# Patient Record
(demographics unavailable — no encounter records)

---

## 2024-10-10 NOTE — HISTORY OF PRESENT ILLNESS
[FreeTextEntry1] : Sandrine Cooper is a 69-year-old female presents for a follow up appointment on GERD. Pt  significally clinically improved with use of omeprazole 40mg daily, asymptomatic. Pt has also lost weight, and made changes to diet. As previously planned would begin to titrate off in three months should pt be feeling well. Pt wishes to continue to move forward with plan. Denies bowel complaints, typically has bowel movements regularly without significant straining or overt bleeding such as melena or hematochezia. Denies upper GI symptoms such as GERD, nausea, vomiting, or dysphagia. Denies unintentional weight loss.

## 2024-10-10 NOTE — ASSESSMENT
[FreeTextEntry1] : 69-year-old female presents for follow up on GERD.   Plan:  GERD: Pt did well on management of GERD with omeprazole daily. Pt has made significant changes to diet and lifestyle. Will begin titrating off omeprazole. Want pt to titrate off slowly to prevent rebound GERD from occurring. Pt advised to utilize Pepcid as needed for intermittent symptoms.   If symptoms return fully may need to discuss restarting PPI. Pt verbalized understanding, all questions answered. Discussed with Dr. Knott.

## 2024-10-10 NOTE — PHYSICAL EXAM
[Alert] : alert [Normal Voice/Communication] : normal voice/communication [Healthy Appearing] : healthy appearing [Sclera] : the sclera and conjunctiva were normal [Hearing Threshold Finger Rub Not [place]] : hearing was normal [Normal Lips/Gums] : the lips and gums were normal [Normal Appearance] : the appearance of the neck was normal [No Respiratory Distress] : no respiratory distress [Auscultation Breath Sounds / Voice Sounds] : lungs were clear to auscultation bilaterally [Heart Rate And Rhythm] : heart rate was normal and rhythm regular [Normal S1, S2] : normal S1 and S2 [Bowel Sounds] : normal bowel sounds [Abdomen Tenderness] : non-tender [Abdomen Soft] : soft [Abnormal Walk] : normal gait [Normal Color / Pigmentation] : normal skin color and pigmentation [Oriented To Time, Place, And Person] : oriented to person, place, and time

## 2024-11-06 NOTE — REVIEW OF SYSTEMS
[Weight Loss (___ Lbs)] : [unfilled] ~Ulb weight loss [SOB] : no shortness of breath [Chest Discomfort] : no chest discomfort [Muscle Cramps] : muscle cramps [Negative] : Heme/Lymph

## 2024-11-06 NOTE — HISTORY OF PRESENT ILLNESS
[FreeTextEntry1] : Sandrine Cooper is a 69-year-old woman with a history of remote syncope (2017), hypercholesterolemia, hypothyroidism, GERD, who presents for cardiology consultation.  She has been under the care of Drs. Joel Goldberg and Edilson Sanchez but now wishes to establish care with me.  She considers him to be in good health and has been feeling well.  She is active and reports a healthy diet--working with nutritionist; weight is decreasing.  She describes the recent onset of severe leg cramps that occur predominantly at night and completely resolved after discontinuing rosuvastatin; symptoms recurred after 1 dose of resuming Rx.  She has no history of ischemic heart disease.  She has not been experiencing angina, dyspnea, or palpitations.  *I reviewed a lipid panel from July 2024: Mild elevation (total 193, , triglyceride 166, and HDL 50).

## 2024-11-06 NOTE — PHYSICAL EXAM
[No Acute Distress] : no acute distress [Normal S1, S2] : normal S1, S2 [Clear Lung Fields] : clear lung fields [Normal Gait] : normal gait [No Edema] : no edema [Normal Speech] : normal speech [Alert and Oriented] : alert and oriented [de-identified] : Overweight [de-identified] : Pupils round [de-identified] : Normocephalic [de-identified] : No JVD [de-identified] : Warm, dry

## 2024-11-06 NOTE — DISCUSSION/SUMMARY
[FreeTextEntry1] :  Hyperlipidemia: Recent LDL not at goal and she seems to describe myalgia related to rosuvastatin.  I suggested that we stop rosuvastatin and substitute atorvastatin 20 mg daily; recheck lipid and metabolic panel in mid-December; in the interim, continued efforts to optimize lifestyle to lower cholesterol as well.  Aortic valve insufficiency: Today's echocardiogram reveals aortic valve sclerosis with mild regurgitation--she describes similar findings on past imaging; stable; observation planned.
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
## 2024-12-26 NOTE — REASON FOR VISIT
[Follow - Up] : a follow-up visit [Hypothyroidism] : hypothyroidism [Thyroid nodule/ MNG] : thyroid nodule/ MNG [Home] : at home, [unfilled] , at the time of the visit. [Other Location: e.g. Home (Enter Location, City,State)___] : at [unfilled] [Patient] : the patient
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
## 2024-12-26 NOTE — ASSESSMENT
[Weight Loss] : weight loss [Levothyroxine] : The patient was instructed to take Levothyroxine on an empty stomach, separate from vitamins, and wait at least 30 minutes before eating [FreeTextEntry1] : 69 year old female with PMH of mild asthma, HLD, insomnia is presenting for thyroid nodule, osteopenia and hypothyroidism.  1. Thyroid nodules: Explained that the overwhelming majority of thyroid nodules (over 90%) are benign, but FNA biopsy is recommended if nodule is over 1cm or showing suspicious features (microcalcifications, irregular borders, tall > wide, hypervascularity). Repeat thyroid sonogram now.   2. Hypothyroidism TSH wnl Jan 2024 LT4 88mcg QD  3. Osteoporosis.  She has no history of fragility fracture.  We discussed the potential benefits and risks of the osteoanabolic and antiresorptive classes of pharmacologic osteoporosis therapy. We also discussed the potential benefits and risks of antiresorptive osteoporosis therapy with denosumab or zoledronic acid at length, including but not limited to osteonecrosis of the jaw and atypical femoral fracture. We discussed that denosumab must be dosed every 6 months due to rebound increase in bone breakdown with abrupt discontinuation of therapy, with transition to bisphosphonate therapy prior to a "drug holiday."    I still recommend therapy with IV reclast pending discussion with dental clearance and labs. Will mail consent to patient.  She will consider her options pending evaluation below.  Metabolic evaluation for secondary causes of osteoporosis.  Calcium 1000 mg daily from diet and supplements (to be taken in divided doses as no more than 500-600 mg can be absorbed at one time); advised increased dietary and/or supplemental calcium Can continue vitamin D supplementation 1,000 intl units daily Diet, weight bearing exercises and fall prevention discussed  Patient verbalized understanding of the above. All questions were answered to patient's satisfaction. Dispo: Patient will follow up in 1 year.

## 2024-12-26 NOTE — DATA REVIEWED
[FreeTextEntry1] : US THYROID ONLY  - ORDERED BY: MERVAT BAUTISTA\par  PROCEDURE DATE:  09/06/2022\par  COMPARISON: None available.\par  \par  FINDINGS:\par  Right Lobe: 3.2 cm x  1.3 cm x 1.4 cm. The gland is diffusely heterogeneous with a small midpole slightly hypoechoic 0.4 cm nodule\par  \par  Left Lobe: 3.1 cm x 1.2 cm x 0.9 cm. Diffusely heterogeneous gland without focal nodule\par  \par  Isthmus: 2 mm.\par  \par  Cervical Lymph Nodes: No enlarged or abnormal morphology cervical nodes.\par  \par  IMPRESSION:\par  Diffusely heterogeneous slightly atrophic thyroid gland suggesting underlying thyroiditis.\par  0.4 cm slightly hypoechoic nodule in the right thyroid\par  TI-RAD 4: Moderately suspicious (FNA if > 1.5 cm, Follow if > 1 cm)

## 2025-01-13 NOTE — ASSESSMENT
[FreeTextEntry1] : Patient is a 68yo female who presents to the office complaining of 2 weeks of nasal congestion, PND, mild cough 2/2 PND.  URI, PND - Discussed with pt symptoms likely viral, no signs/symptoms of acute bacterial infection at this time. - Supportive care discussed. - Encouraged to add antihistamine, Flonase. - Alert office if symptoms persist/worsen over the next week to consider abx. - Call the office or go to the ED immediately if you develop new, worsening or concerning symptoms including high fever, severe headache/worst headache of your life, confusion, dizziness/lightheadedness, loss of consciousness, severe chest pain, difficulty breathing, shortness of breath, severe abdominal pain, excessive vomiting/diarrhea, inability to feel/move the extremities, or any other concerning symptoms.

## 2025-01-13 NOTE — PHYSICAL EXAM
[Normal Outer Ear/Nose] : the outer ears and nose were normal in appearance [No Edema] : there was no peripheral edema [Normal] : no rash [Coordination Grossly Intact] : coordination grossly intact [No Focal Deficits] : no focal deficits [Normal Gait] : normal gait [Normal Affect] : the affect was normal [Normal Insight/Judgement] : insight and judgment were intact [de-identified] : small fluid behind bilateral TMs; nasal mucosa mildly edematous/erythematous with clear drainage; PND, mild erythema, no exudates.

## 2025-01-13 NOTE — HISTORY OF PRESENT ILLNESS
[FreeTextEntry8] : Pt reports URI symptoms, nasal congestion x2 weeks. States NC/drainage and PND has persisted and is not resolving. Has occasional cough 2/2 PND, dry and non-productive. Felt a little wheeze last night while laying down before bed. Pt has hx sinus infections, states sinuses do not feel full or painful with current infection. Denies fevers. Not getting in the way of ADLs. Pt has been taking Dayquil, Nyquil for symptoms.

## 2025-01-13 NOTE — REVIEW OF SYSTEMS
[Earache] : no earache [Nasal Discharge] : nasal discharge [Sore Throat] : no sore throat [Postnasal Drip] : postnasal drip [Shortness Of Breath] : no shortness of breath [Wheezing] : no wheezing [Cough] : cough [Negative] : Heme/Lymph

## 2025-01-16 NOTE — ASSESSMENT
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 Discussed importance of high fiber diet and oral hydration. Pt agreeable, will call with any further questions or concerns.

## 2025-01-16 NOTE — PHYSICAL EXAM
[Alert] : alert [Healthy Appearing] : healthy appearing [Sclera] : the sclera and conjunctiva were normal [Hearing Threshold Finger Rub Not [place]] : hearing was normal [Normal Appearance] : the appearance of the neck was normal [No Respiratory Distress] : no respiratory distress [Heart Rate And Rhythm] : heart rate was normal and rhythm regular [Auscultation Breath Sounds / Voice Sounds] : lungs were clear to auscultation bilaterally [Bowel Sounds] : normal bowel sounds [Abdomen Tenderness] : non-tender [Abdomen Soft] : soft [Abnormal Walk] : normal gait [Normal Color / Pigmentation] : normal skin color and pigmentation [Oriented To Time, Place, And Person] : oriented to person, place, and time

## 2025-01-16 NOTE — HISTORY OF PRESENT ILLNESS
[FreeTextEntry1] : Sandrine Cooper is a 68 yo F presenting today for follow up for bowel disturbances. Pt notes in early December had possible viral illness causing diarrhea then had subsequent constipation. called the office several times for guidance with miralax, with good relief. Pt does not want to continue taking regularly. At this point, has been having daily bowel movements sometimes more substantial than others but still feels like she is completely emptying. Denies bleeding such as melena or hematochezia.

## 2025-02-13 NOTE — HISTORY OF PRESENT ILLNESS
[No Pertinent Cardiac History] : no history of aortic stenosis, atrial fibrillation, coronary artery disease, recent myocardial infarction, or implantable device/pacemaker [No Pertinent Pulmonary History] : no history of asthma, COPD, sleep apnea, or smoking [No Adverse Anesthesia Reaction] : no adverse anesthesia reaction in self or family member [(Patient denies any chest pain, claudication, dyspnea on exertion, orthopnea, palpitations or syncope)] : Patient denies any chest pain, claudication, dyspnea on exertion, orthopnea, palpitations or syncope [Chronic Anticoagulation] : no chronic anticoagulation [Chronic Kidney Disease] : no chronic kidney disease [Diabetes] : no diabetes [FreeTextEntry1] : Cataract surgery [FreeTextEntry2] : 3/4/25, 3/11/25 [FreeTextEntry3] : Dr. Palma  [FreeTextEntry4] : Patient is a 70yo female presenting for a preop clearance for cataract surgeries, right on 3/4, left on 3/11 with Dr. Palma. PMH includes HLD, Hypothyroidism, GERD - doesn't need labs  - needs an EKG within 12mo  - had one with cards, Dr. Alfred on 11/06/24

## 2025-04-03 NOTE — REASON FOR VISIT
[Follow - Up] : a follow-up visit [Hypothyroidism] : hypothyroidism [Thyroid nodule/ MNG] : thyroid nodule/ MNG [Other Location: e.g. Home (Enter Location, City,State)___] : at [unfilled] [Patient] : the patient [Home] : at home, [unfilled] , at the time of the visit. [Medical Office: ([facility] [place])___] : at the medical office located in  [Telehealth (audio & video)] : This visit was provided via telehealth using real-time 2-way audio visual technology. [Verbal consent obtained from patient] : the patient, [unfilled]
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
## 2025-04-03 NOTE — ASSESSMENT
[Weight Loss] : weight loss [Levothyroxine] : The patient was instructed to take Levothyroxine on an empty stomach, separate from vitamins, and wait at least 30 minutes before eating [FreeTextEntry1] : 69 year old female with PMH of mild asthma, HLD, insomnia is presenting for thyroid nodule, osteopenia and hypothyroidism.  1. Thyroid nodules: Explained that the overwhelming majority of thyroid nodules (over 90%) are benign, but FNA biopsy is recommended if nodule is over 1cm or showing suspicious features (microcalcifications, irregular borders, tall > wide, hypervascularity). Repeat thyroid sonogram now.   2. Hypothyroidism TSH wnl July 2024 LT4 88mcg QD  3. Osteoporosis.  She has no history of fragility fracture.  We discussed the potential benefits and risks of the osteoanabolic and antiresorptive classes of pharmacologic osteoporosis therapy. We also discussed the potential benefits and risks of antiresorptive osteoporosis therapy with denosumab or zoledronic acid at length, including but not limited to osteonecrosis of the jaw and atypical femoral fracture. We discussed that denosumab must be dosed every 6 months due to rebound increase in bone breakdown with abrupt discontinuation of therapy, with transition to bisphosphonate therapy prior to a "drug holiday."    I still recommend therapy with IV reclast pending discussion with GI clearance for oral bisphosphonates. Pt concerns for reclast side effects.   She will consider her options pending above and labs.   Calcium 1000 mg daily from diet and supplements (to be taken in divided doses as no more than 500-600 mg can be absorbed at one time); advised increased dietary and/or supplemental calcium Can continue vitamin D supplementation 1,000 intl units daily Diet, weight bearing exercises and fall prevention discussed  Patient verbalized understanding of the above. All questions were answered to patient's satisfaction. Dispo: Patient will follow up in 1 year. She will update me regarding above in may 2025.

## 2025-04-05 NOTE — PHYSICAL EXAM

## 2025-04-05 NOTE — HISTORY OF PRESENT ILLNESS
[FreeTextEntry1] : 68yo female with hx GERD, esophageal stricture  She is now with osteoporosis and endocrine wants to start foaxamax However, given her history of UGI symptoms and inflammation with stricture wanted clarification if increased risk from starting fosamax

## 2025-04-05 NOTE — ASSESSMENT
[FreeTextEntry1] : 68yo female with gerd, osteoporosis  ok to take fosamax without concern  no increased risk of complications fro, her disease  famotidine, PPI on demand

## 2025-04-05 NOTE — HISTORY OF PRESENT ILLNESS
[FreeTextEntry1] : 70yo female with hx GERD, esophageal stricture  She is now with osteoporosis and endocrine wants to start foaxamax However, given her history of UGI symptoms and inflammation with stricture wanted clarification if increased risk from starting fosamax

## 2025-04-27 NOTE — PROCEDURE
[Large Joint Injection] : Large joint injection [Left] : of the left [Knee] : knee [Pain] : pain [Inflammation] : inflammation [X-ray evidence of Osteoarthritis on this or prior visit] : x-ray evidence of Osteoarthritis on this or prior visit [Alcohol] : alcohol [Betadine] : betadine [Ethyl Chloride sprayed topically] : ethyl chloride sprayed topically [Sterile technique used] : sterile technique used [___ cc    1%] : Lidocaine ~Vcc of 1%  [___ cc    0.25%] : Bupivacaine (Marcaine) ~Vcc of 0.25%  [___ cc    80mg] : Methylprednisolone (Depomedrol) ~Vcc of 80 mg  [] : Patient tolerated procedure well [Call if redness, pain or fever occur] : call if redness, pain or fever occur [Previous OTC use and PT nontherapeutic] : patient has tried OTC's including aspirin, Ibuprofen, Aleve, etc or prescription NSAIDS, and/or exercises at home and/or physical therapy without satisfactory response [Patient had decreased mobility in the joint] : patient had decreased mobility in the joint [Risks, benefits, alternatives discussed / Verbal consent obtained] : the risks benefits, and alternatives have been discussed, and verbal consent was obtained

## 2025-04-27 NOTE — ASSESSMENT
[FreeTextEntry1] : The patient is here to consider a second CSI. She tolerated the last one well. She denies new trauma but notes increased pain as a result of ADLs and overuse. She is relatively unchanged. She has increased pain that is intermittent and moderate. The patient has the most pain with going up and down stairs. She has pain with ADLs and activity. She has no pain with exercise and weight training but has soreness after. She has had to recently stop activity due to pain. Her pain is anterior, surrounding the patella. She uses ibuprofen with relief. She has not had any other treatement.   Left knee exam: Well appearing female in no apparent distress. No rashes, scars, or abrasions. Neurovascularly intact. Tender to palpation at medial joint line. Ranging from 0-120 with pain at EROM flexion. No deformity noted. Anterior/posterior drawer negative, - Mcmurrays. - Lachmans. Screening exam of the left hip shows a full, painless ROM.  The patient received a left knee CSI. She tolerated it well. She will return as needed.

## 2025-04-27 NOTE — HISTORY OF PRESENT ILLNESS
[Localized] : localized [] : no [FreeTextEntry5] : 68 y/o F presents for f/u eval today. Previous tx CSI provided good relief until last week.

## 2025-04-27 NOTE — HISTORY OF PRESENT ILLNESS
[Localized] : localized [] : no [FreeTextEntry5] : 70 y/o F presents for f/u eval today. Previous tx CSI provided good relief until last week.